# Patient Record
(demographics unavailable — no encounter records)

---

## 2017-07-31 NOTE — RAD
Chest one view and 2 view left rib 7/31/2017



Clinical indication: Left rib pain.



Comparison: None.



Findings: Partial visualization of a gastric lap band. Cardiac and mediastinal

silhouettes are within normal limits. No pleural effusion, pneumothorax or

focal consolidation. No acute displaced left rib fracture deformity.



Impression: No acute cardiopulmonary abnormality or displaced left rib

fracture deformity. If there is continued concern, noncontrast CT is more

sensitive.